# Patient Record
Sex: MALE | Race: WHITE | HISPANIC OR LATINO | RURAL
[De-identification: names, ages, dates, MRNs, and addresses within clinical notes are randomized per-mention and may not be internally consistent; named-entity substitution may affect disease eponyms.]

---

## 2022-07-19 ENCOUNTER — APPOINTMENT (RX ONLY)
Dept: RURAL CLINIC 8 | Facility: CLINIC | Age: 32
Setting detail: DERMATOLOGY
End: 2022-07-19

## 2022-07-19 DIAGNOSIS — Z71.89 OTHER SPECIFIED COUNSELING: ICD-10-CM

## 2022-07-19 DIAGNOSIS — B36.0 PITYRIASIS VERSICOLOR: ICD-10-CM

## 2022-07-19 DIAGNOSIS — D22 MELANOCYTIC NEVI: ICD-10-CM

## 2022-07-19 PROBLEM — D22.4 MELANOCYTIC NEVI OF SCALP AND NECK: Status: ACTIVE | Noted: 2022-07-19

## 2022-07-19 PROBLEM — D22.39 MELANOCYTIC NEVI OF OTHER PARTS OF FACE: Status: ACTIVE | Noted: 2022-07-19

## 2022-07-19 PROCEDURE — ? COUNSELING

## 2022-07-19 PROCEDURE — 99203 OFFICE O/P NEW LOW 30 MIN: CPT

## 2022-07-19 PROCEDURE — ? EDUCATIONAL RESOURCES PROVIDED

## 2022-07-19 PROCEDURE — ? PRESCRIPTION

## 2022-07-19 RX ORDER — KETOCONAZOLE 20 MG/ML
SHAMPOO, SUSPENSION TOPICAL BIW
Qty: 120 | Refills: 3 | Status: ERX | COMMUNITY
Start: 2022-07-19

## 2022-07-19 RX ADMIN — KETOCONAZOLE: 20 SHAMPOO, SUSPENSION TOPICAL at 00:00

## 2022-07-19 ASSESSMENT — LOCATION SIMPLE DESCRIPTION DERM
LOCATION SIMPLE: LEFT FOREHEAD
LOCATION SIMPLE: RIGHT SCALP
LOCATION SIMPLE: LEFT UPPER BACK
LOCATION SIMPLE: CHEST
LOCATION SIMPLE: LEFT SCALP
LOCATION SIMPLE: SCALP
LOCATION SIMPLE: LEFT CLAVICULAR SKIN

## 2022-07-19 ASSESSMENT — LOCATION ZONE DERM
LOCATION ZONE: FACE
LOCATION ZONE: SCALP
LOCATION ZONE: TRUNK

## 2022-07-19 ASSESSMENT — LOCATION DETAILED DESCRIPTION DERM
LOCATION DETAILED: LEFT SUPERIOR UPPER BACK
LOCATION DETAILED: LEFT INFERIOR LATERAL FOREHEAD
LOCATION DETAILED: LEFT SUPERIOR PARIETAL SCALP
LOCATION DETAILED: LEFT CENTRAL PARIETAL SCALP
LOCATION DETAILED: LEFT CLAVICULAR SKIN
LOCATION DETAILED: RIGHT LATERAL FRONTAL SCALP
LOCATION DETAILED: LEFT CENTRAL FRONTAL SCALP
LOCATION DETAILED: LEFT LATERAL FOREHEAD
LOCATION DETAILED: LEFT LATERAL SUPERIOR CHEST
LOCATION DETAILED: RIGHT SUPERIOR PARIETAL SCALP

## 2022-07-19 NOTE — PROCEDURE: COUNSELING
Detail Level: Simple
Detail Level: Detailed
Patient Specific Counseling (Will Not Stick From Patient To Patient): Patient was advised to try head and shoulders or [place] Blue shampoo as well.